# Patient Record
Sex: MALE | Race: WHITE | NOT HISPANIC OR LATINO
[De-identification: names, ages, dates, MRNs, and addresses within clinical notes are randomized per-mention and may not be internally consistent; named-entity substitution may affect disease eponyms.]

---

## 2017-04-04 ENCOUNTER — APPOINTMENT (OUTPATIENT)
Dept: SURGERY | Facility: CLINIC | Age: 67
End: 2017-04-04

## 2017-04-04 VITALS
HEART RATE: 62 BPM | SYSTOLIC BLOOD PRESSURE: 146 MMHG | HEIGHT: 73 IN | DIASTOLIC BLOOD PRESSURE: 76 MMHG | WEIGHT: 195 LBS | BODY MASS INDEX: 25.84 KG/M2

## 2017-04-04 PROBLEM — Z00.00 ENCOUNTER FOR PREVENTIVE HEALTH EXAMINATION: Status: ACTIVE | Noted: 2017-04-04

## 2017-05-08 ENCOUNTER — FORM ENCOUNTER (OUTPATIENT)
Age: 67
End: 2017-05-08

## 2017-05-09 ENCOUNTER — OUTPATIENT (OUTPATIENT)
Dept: OUTPATIENT SERVICES | Facility: HOSPITAL | Age: 67
LOS: 1 days | End: 2017-05-09
Payer: MEDICARE

## 2017-05-09 ENCOUNTER — APPOINTMENT (OUTPATIENT)
Dept: ULTRASOUND IMAGING | Facility: IMAGING CENTER | Age: 67
End: 2017-05-09

## 2017-05-09 ENCOUNTER — APPOINTMENT (OUTPATIENT)
Dept: CT IMAGING | Facility: IMAGING CENTER | Age: 67
End: 2017-05-09

## 2017-05-09 DIAGNOSIS — Z00.8 ENCOUNTER FOR OTHER GENERAL EXAMINATION: ICD-10-CM

## 2017-05-09 PROCEDURE — 76536 US EXAM OF HEAD AND NECK: CPT

## 2017-05-09 PROCEDURE — 82565 ASSAY OF CREATININE: CPT

## 2017-05-09 PROCEDURE — 70492 CT SFT TSUE NCK W/O & W/DYE: CPT

## 2017-05-31 ENCOUNTER — OUTPATIENT (OUTPATIENT)
Dept: OUTPATIENT SERVICES | Facility: HOSPITAL | Age: 67
LOS: 1 days | End: 2017-05-31
Payer: MEDICARE

## 2017-05-31 VITALS
SYSTOLIC BLOOD PRESSURE: 122 MMHG | TEMPERATURE: 98 F | DIASTOLIC BLOOD PRESSURE: 70 MMHG | WEIGHT: 197.98 LBS | RESPIRATION RATE: 14 BRPM | HEART RATE: 55 BPM | HEIGHT: 73 IN

## 2017-05-31 DIAGNOSIS — E21.0 PRIMARY HYPERPARATHYROIDISM: ICD-10-CM

## 2017-05-31 DIAGNOSIS — E21.5 DISORDER OF PARATHYROID GLAND, UNSPECIFIED: ICD-10-CM

## 2017-05-31 LAB
BASOPHILS # BLD AUTO: 0.02 K/UL — SIGNIFICANT CHANGE UP (ref 0–0.2)
BASOPHILS NFR BLD AUTO: 0.3 % — SIGNIFICANT CHANGE UP (ref 0–2)
BUN SERPL-MCNC: 13 MG/DL — SIGNIFICANT CHANGE UP (ref 7–23)
CALCIUM SERPL-MCNC: 11.1 MG/DL — HIGH (ref 8.4–10.5)
CHLORIDE SERPL-SCNC: 104 MMOL/L — SIGNIFICANT CHANGE UP (ref 98–107)
CO2 SERPL-SCNC: 27 MMOL/L — SIGNIFICANT CHANGE UP (ref 22–31)
CREAT SERPL-MCNC: 1.12 MG/DL — SIGNIFICANT CHANGE UP (ref 0.5–1.3)
EOSINOPHIL # BLD AUTO: 0.24 K/UL — SIGNIFICANT CHANGE UP (ref 0–0.5)
EOSINOPHIL NFR BLD AUTO: 3.3 % — SIGNIFICANT CHANGE UP (ref 0–6)
GLUCOSE SERPL-MCNC: 79 MG/DL — SIGNIFICANT CHANGE UP (ref 70–99)
HCT VFR BLD CALC: 46.3 % — SIGNIFICANT CHANGE UP (ref 39–50)
HGB BLD-MCNC: 14.7 G/DL — SIGNIFICANT CHANGE UP (ref 13–17)
IMM GRANULOCYTES NFR BLD AUTO: 0.6 % — SIGNIFICANT CHANGE UP (ref 0–1.5)
LYMPHOCYTES # BLD AUTO: 1.7 K/UL — SIGNIFICANT CHANGE UP (ref 1–3.3)
LYMPHOCYTES # BLD AUTO: 23.6 % — SIGNIFICANT CHANGE UP (ref 13–44)
MCHC RBC-ENTMCNC: 27.1 PG — SIGNIFICANT CHANGE UP (ref 27–34)
MCHC RBC-ENTMCNC: 31.7 % — LOW (ref 32–36)
MCV RBC AUTO: 85.3 FL — SIGNIFICANT CHANGE UP (ref 80–100)
MONOCYTES # BLD AUTO: 0.67 K/UL — SIGNIFICANT CHANGE UP (ref 0–0.9)
MONOCYTES NFR BLD AUTO: 9.3 % — SIGNIFICANT CHANGE UP (ref 2–14)
NEUTROPHILS # BLD AUTO: 4.54 K/UL — SIGNIFICANT CHANGE UP (ref 1.8–7.4)
NEUTROPHILS NFR BLD AUTO: 62.9 % — SIGNIFICANT CHANGE UP (ref 43–77)
PLATELET # BLD AUTO: 239 K/UL — SIGNIFICANT CHANGE UP (ref 150–400)
PMV BLD: 10.5 FL — SIGNIFICANT CHANGE UP (ref 7–13)
POTASSIUM SERPL-MCNC: 4.5 MMOL/L — SIGNIFICANT CHANGE UP (ref 3.5–5.3)
POTASSIUM SERPL-SCNC: 4.5 MMOL/L — SIGNIFICANT CHANGE UP (ref 3.5–5.3)
RBC # BLD: 5.43 M/UL — SIGNIFICANT CHANGE UP (ref 4.2–5.8)
RBC # FLD: 14.3 % — SIGNIFICANT CHANGE UP (ref 10.3–14.5)
SODIUM SERPL-SCNC: 141 MMOL/L — SIGNIFICANT CHANGE UP (ref 135–145)
WBC # BLD: 7.21 K/UL — SIGNIFICANT CHANGE UP (ref 3.8–10.5)
WBC # FLD AUTO: 7.21 K/UL — SIGNIFICANT CHANGE UP (ref 3.8–10.5)

## 2017-05-31 PROCEDURE — 93010 ELECTROCARDIOGRAM REPORT: CPT

## 2017-05-31 NOTE — H&P PST ADULT - NEGATIVE GENERAL SYMPTOMS
no fever/no sweating/no polyphagia/no anorexia/no weight loss/no weight gain/no fatigue/no chills/no malaise/no polydipsia/no polyuria

## 2017-05-31 NOTE — H&P PST ADULT - NEGATIVE BREAST SYMPTOMS
no breast lump R/no breast tenderness L/no breast tenderness R/no nipple discharge R/no breast lump L/no nipple discharge L

## 2017-05-31 NOTE — H&P PST ADULT - NEGATIVE GENERAL GENITOURINARY SYMPTOMS
no flank pain L/normal urinary frequency/no flank pain R/no nocturia/no hematuria/no urinary hesitancy/no bladder infections/no dysuria

## 2017-05-31 NOTE — H&P PST ADULT - RS GEN PE MLT RESP DETAILS PC
no intercostal retractions/clear to auscultation bilaterally/no wheezes/breath sounds equal/good air movement/no chest wall tenderness/respirations non-labored/no rales/no rhonchi

## 2017-05-31 NOTE — H&P PST ADULT - NEGATIVE CARDIOVASCULAR SYMPTOMS
no dyspnea on exertion/no claudication/no paroxysmal nocturnal dyspnea/no palpitations/no peripheral edema/no chest pain/no orthopnea

## 2017-05-31 NOTE — H&P PST ADULT - PROBLEM SELECTOR PLAN 1
Pt scheduled for parathyroidectomy with pth assay on 6/12/2017.  labs- cbc, bmp done - evaluate calcium levels, ekg done.  Preop teaching done, pt able to verbalize understanding

## 2017-05-31 NOTE — H&P PST ADULT - HISTORY OF PRESENT ILLNESS
67y/o male scheduled for parathyroidectomy with PTH assay on 6/12/2017.  Pt states, "has elevated calcium levels for the past 3 yrs."

## 2017-05-31 NOTE — H&P PST ADULT - NSANTHOSAYNRD_GEN_A_CORE
No. CARLY screening performed.  STOP BANG Legend: 0-2 = LOW Risk; 3-4 = INTERMEDIATE Risk; 5-8 = HIGH Risk

## 2017-05-31 NOTE — H&P PST ADULT - GASTROINTESTINAL DETAILS
no organomegaly/no rigidity/soft/nontender/bowel sounds normal/no rebound tenderness/no distention/no guarding/no masses palpable/no bruit

## 2017-06-12 ENCOUNTER — APPOINTMENT (OUTPATIENT)
Dept: SURGERY | Facility: HOSPITAL | Age: 67
End: 2017-06-12

## 2017-06-12 ENCOUNTER — RESULT REVIEW (OUTPATIENT)
Age: 67
End: 2017-06-12

## 2017-06-12 ENCOUNTER — OUTPATIENT (OUTPATIENT)
Dept: OUTPATIENT SERVICES | Facility: HOSPITAL | Age: 67
LOS: 1 days | Discharge: HOME | End: 2017-06-12
Payer: MEDICARE

## 2017-06-12 ENCOUNTER — TRANSCRIPTION ENCOUNTER (OUTPATIENT)
Age: 67
End: 2017-06-12

## 2017-06-12 ENCOUNTER — OTHER (OUTPATIENT)
Age: 67
End: 2017-06-12

## 2017-06-12 VITALS
OXYGEN SATURATION: 98 % | SYSTOLIC BLOOD PRESSURE: 133 MMHG | HEIGHT: 73 IN | DIASTOLIC BLOOD PRESSURE: 68 MMHG | RESPIRATION RATE: 14 BRPM | HEART RATE: 53 BPM | WEIGHT: 197.09 LBS | TEMPERATURE: 99 F

## 2017-06-12 VITALS
SYSTOLIC BLOOD PRESSURE: 128 MMHG | RESPIRATION RATE: 12 BRPM | DIASTOLIC BLOOD PRESSURE: 68 MMHG | OXYGEN SATURATION: 96 % | HEART RATE: 66 BPM

## 2017-06-12 DIAGNOSIS — E21.0 PRIMARY HYPERPARATHYROIDISM: ICD-10-CM

## 2017-06-12 PROCEDURE — 60500 EXPLORE PARATHYROID GLANDS: CPT

## 2017-06-12 PROCEDURE — 13132 CMPLX RPR F/C/C/M/N/AX/G/H/F: CPT | Mod: 59

## 2017-06-12 PROCEDURE — 88305 TISSUE EXAM BY PATHOLOGIST: CPT | Mod: 26

## 2017-06-12 PROCEDURE — 88334 PATH CONSLTJ SURG CYTO XM EA: CPT | Mod: 26,59

## 2017-06-12 PROCEDURE — 88331 PATH CONSLTJ SURG 1 BLK 1SPC: CPT | Mod: 26

## 2017-06-12 RX ORDER — CALCIUM CARBONATE 500(1250)
1 TABLET ORAL
Qty: 0 | Refills: 0 | COMMUNITY
Start: 2017-06-12

## 2017-06-12 RX ORDER — SODIUM CHLORIDE 9 MG/ML
1000 INJECTION, SOLUTION INTRAVENOUS
Qty: 0 | Refills: 0 | Status: DISCONTINUED | OUTPATIENT
Start: 2017-06-12 | End: 2017-06-13

## 2017-06-12 RX ORDER — ACETAMINOPHEN 500 MG
2 TABLET ORAL
Qty: 0 | Refills: 0 | COMMUNITY
Start: 2017-06-12

## 2017-06-12 RX ORDER — CALCIUM CARBONATE 500(1250)
1 TABLET ORAL
Qty: 0 | Refills: 0 | Status: DISCONTINUED | OUTPATIENT
Start: 2017-06-12 | End: 2017-06-13

## 2017-06-12 RX ORDER — ACETAMINOPHEN 500 MG
650 TABLET ORAL EVERY 6 HOURS
Qty: 0 | Refills: 0 | Status: DISCONTINUED | OUTPATIENT
Start: 2017-06-12 | End: 2017-06-13

## 2017-06-12 RX ADMIN — Medication 1 TABLET(S): at 13:24

## 2017-06-12 NOTE — ASU DISCHARGE PLAN (ADULT/PEDIATRIC). - NOTIFY
Fever greater than 101/Swelling that continues/Pain not relieved by Medications/Numbness, tingling/Bleeding that does not stop

## 2017-06-12 NOTE — BRIEF OPERATIVE NOTE - PROCEDURE
Parathyroidectomy  06/12/2017    Active  KTORTORELL  Parathyroid hormone (PTH) antibody assay  06/12/2017    Active  KTORTORELL

## 2017-06-12 NOTE — ASU DISCHARGE PLAN (ADULT/PEDIATRIC). - NURSING INSTRUCTIONS
You were given 1000mg IV Tylenol for pain management.  Please DO NOT take any Tylenol containing products, such as  Vicodin, Percocet, Excedrin, many cold preparations for the next 8 hours (until 8p ).  DO NOT EXCEED 3000MG OF TYLENOL OVER 24 HOURS.   Dr. Ceballos does not want you taking any NSAIDS.   Please DO Not take Motrin/Ibuprofen/Advil/Aleve (NSAIDS)

## 2017-06-12 NOTE — ASU DISCHARGE PLAN (ADULT/PEDIATRIC). - MEDICATION SUMMARY - MEDICATIONS TO TAKE
I will START or STAY ON the medications listed below when I get home from the hospital:    see medication reconciliation form  --     -- Indication: For Primary hyperparathyroidism    acetaminophen 325 mg oral tablet  -- 2 tab(s) by mouth every 6 hours, As needed, Moderate Pain (4 - 6)  -- Indication: For Primary hyperparathyroidism    calcium carbonate 500 mg (200 mg elemental calcium) oral tablet, chewable  -- 1 tab(s) by mouth 4 times a day  -- Indication: For Primary hyperparathyroidism

## 2017-06-14 LAB — SURGICAL PATHOLOGY STUDY: SIGNIFICANT CHANGE UP

## 2017-06-16 PROBLEM — Z87.442 PERSONAL HISTORY OF URINARY CALCULI: Chronic | Status: ACTIVE | Noted: 2017-05-31

## 2017-06-27 ENCOUNTER — APPOINTMENT (OUTPATIENT)
Dept: SURGERY | Facility: CLINIC | Age: 67
End: 2017-06-27

## 2017-06-29 ENCOUNTER — RESULT REVIEW (OUTPATIENT)
Age: 67
End: 2017-06-29

## 2017-06-29 ENCOUNTER — OTHER (OUTPATIENT)
Age: 67
End: 2017-06-29

## 2017-06-29 LAB
25(OH)D3 SERPL-MCNC: 23 NG/ML
CALCIUM SERPL-MCNC: 9.4 MG/DL
CALCIUM SERPL-MCNC: 9.4 MG/DL
PARATHYROID HORMONE INTACT: 55 PG/ML

## 2018-01-23 ENCOUNTER — APPOINTMENT (OUTPATIENT)
Dept: SURGERY | Facility: CLINIC | Age: 68
End: 2018-01-23

## 2018-07-17 ENCOUNTER — APPOINTMENT (OUTPATIENT)
Dept: SURGERY | Facility: CLINIC | Age: 68
End: 2018-07-17
Payer: MEDICARE

## 2018-07-17 DIAGNOSIS — E21.3 HYPERPARATHYROIDISM, UNSPECIFIED: ICD-10-CM

## 2018-07-17 PROCEDURE — 36415 COLL VENOUS BLD VENIPUNCTURE: CPT

## 2018-07-17 PROCEDURE — 99213 OFFICE O/P EST LOW 20 MIN: CPT

## 2018-07-19 ENCOUNTER — RESULT REVIEW (OUTPATIENT)
Age: 68
End: 2018-07-19

## 2018-07-19 LAB
24R-OH-CALCIDIOL SERPL-MCNC: 38 PG/ML
CALCIUM SERPL-MCNC: 9.6 MG/DL
CALCIUM SERPL-MCNC: 9.6 MG/DL
PARATHYROID HORMONE INTACT: 35 PG/ML
